# Patient Record
Sex: FEMALE | Race: ASIAN | NOT HISPANIC OR LATINO | Employment: FULL TIME | URBAN - METROPOLITAN AREA
[De-identification: names, ages, dates, MRNs, and addresses within clinical notes are randomized per-mention and may not be internally consistent; named-entity substitution may affect disease eponyms.]

---

## 2017-05-03 ENCOUNTER — TELEPHONE (OUTPATIENT)
Dept: NEUROLOGY | Facility: CLINIC | Age: 67
End: 2017-05-03

## 2017-05-03 NOTE — TELEPHONE ENCOUNTER
----- Message from Lulu Cesar sent at 5/3/2017  3:27 PM CDT -----  Contact: Dagoberto  [phone]  Berry is calling to get a new patient appt for his wife Parkinson's. Please call

## 2017-05-03 NOTE — TELEPHONE ENCOUNTER
----- Message from Lulu Cesar sent at 5/3/2017  3:27 PM CDT -----  Contact: Dagoberto  [phone]  Berry is calling to get a new patient appt for his wife Parkinson's. Please call

## 2017-05-03 NOTE — TELEPHONE ENCOUNTER
Spoke to pt's , Dagoberto, and advised that in order to establish care with PD provider here, it would be best to schedule with NP Marc. 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 An appt has been scheduled for 5/23/17 @ 1:15 with eDbbie here at [facility]. Appt letter to be scanned and emailed to designated address.

## 2017-05-05 NOTE — TELEPHONE ENCOUNTER
Called pt  and scheduled his wife with Dr. Gentile on July 6 at 1:20 p.m. He mentioned that his wife is currently in [place] working and that he will call back and let me know if that's a good day and time to stick with.

## 2017-05-23 ENCOUNTER — OFFICE VISIT (OUTPATIENT)
Dept: NEUROLOGY | Facility: CLINIC | Age: 67
End: 2017-05-23
Payer: MEDICARE

## 2017-05-23 VITALS
HEART RATE: 73 BPM | SYSTOLIC BLOOD PRESSURE: 112 MMHG | WEIGHT: 109.81 LBS | DIASTOLIC BLOOD PRESSURE: 65 MMHG | HEIGHT: 62 IN | BODY MASS INDEX: 20.21 KG/M2

## 2017-05-23 DIAGNOSIS — G20.A1 PARKINSON DISEASE: Primary | ICD-10-CM

## 2017-05-23 PROCEDURE — 99999 PR PBB SHADOW E&M-EST. PATIENT-LVL III: CPT | Mod: PBBFAC,,, | Performed by: NURSE PRACTITIONER

## 2017-05-23 PROCEDURE — 99205 OFFICE O/P NEW HI 60 MIN: CPT | Mod: S$GLB,,, | Performed by: NURSE PRACTITIONER

## 2017-05-23 RX ORDER — AMANTADINE HYDROCHLORIDE 100 MG/1
TABLET ORAL
Qty: 45 TABLET | Refills: 11 | Status: SHIPPED | OUTPATIENT
Start: 2017-05-23 | End: 2018-06-18 | Stop reason: SDUPTHER

## 2017-05-23 RX ORDER — RASAGILINE 0.5 MG/1
1 TABLET ORAL DAILY
COMMUNITY
End: 2017-10-13 | Stop reason: SDUPTHER

## 2017-05-23 RX ORDER — IBUPROFEN 200 MG
200 TABLET ORAL
COMMUNITY

## 2017-05-23 NOTE — PROGRESS NOTES
"Name: Marie Blake  MRN: [id number]   CSN: [id number]      Date: 05/23/2017    Referring physician:  Min Rachel MD  [address] [place] [address] [place] [id number]    Chief Complaint / Interval History: Parkinson's disease    History of Present Illness (HPI):    67 yo RH female here to establish care for PD, alone today. 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 She has always been an avid exerciser (hiking, biking, swimming). She walks 10,000 steps every day.   She provides the following timeline...  -Nov 2014 was walking down a hill while hiking with friend and foot caught causing her to fall- hit face and fractured L wrist, had surgery for wrist  -2015- saw doctor in [facility] as felt "slightly off" when walked but could not otherwise explain but no dizziness and no additional falls. MRI brain revealed meningioma and not felt to be the cause of her symptoms. Sees this provider q 3 mos.  -Nov 2015- co-worker noticed tremor Left index finger. This was the first time she was aware of tremor. She reaseched why this could happen and Parkinson's was one possible explanation. Began to notice resting tremor in LH.   -2016- saw PCP in [place] and he felt she had PD- re-scanned brain to eval meningioma, had not grown.  -May 2016-Isatu scan positive and began rasagiline, does find helpful.  -November 2016- saw doctor in [place] (at [facility]) as they will likely retire there to be near daughter and wants to have established care there, doctor also agreed with dx of PD (stage 2).    At this point, tremor mainly noted in LH. Over the past couple of months, she has noted occasional, slight tremor in RH. She has also noted intermittent shaking in BLE when sitting and at times when crosses legs. When stressed or nervous, tremor increases. Aware of tremor with eating and drinking. Handwriting has not been an issue and has not noted " "changes. She does feel rasagiline helped slow tremor. She did come off this for 2 weeks before cataract surgery in the Fall. Now not as sure of benefits but tolerates without issue and does not cost her anything. She likes the idea of possibly slowing PD and wants to stay on this.   She has also noted the following symptoms...  -Joint pain in hands  -Fatigue  -Muscle stiffness at times,-generally neck wonders if this is due to the amount of time she is on her computer   -Balance "not as good but not bad." No recent falls. No shuffle.    She is not aware of slowness at all. Some days she feels "really okay" does not notice her symptoms at all. Some days just hit and she is more aware of symptoms.  She is trying to decide if she should continue to work. She does not want anyone at work to know she has PD. She states that does worry people at work will see the tremor and she worries when she has to give presentations. She says they have a strong rumor mill at work and just does not want people to speculate and talk. At 66 yoa, those at her work are typically expected to retire. She was offered a one year extension. She has taken this but does wonder what her disease will be like in the future and wonders if she should retire. She is interested in traveling.       Nonmotor/Premotor ROS:  Hyposmia (HENT)?No   Sialorrhea (HENT)? Will feel saliva corner of mouth on rare occasion  Dyshagia (HENT)? No  RBD/sleep issues (Constitutional)?Yes- will awake once or twice to go to restroom but sleeps 7-8 hours -  has commented that she will talk in sleep or kick on occasion  Depression/anxiety (Psychiatric)?Yes- was a little depressed when initially told had PD  Fatigue (Constitutional)?Yes  Constipation (GI)?"not as regular but not real constipation"  Urinary issues ()?No  Orthostasis (Cardiovascular)?No  Leg swelling (Cardiovascular)? No  Falls (Musculoskeletal)?No  Cognitive impairment (Neurologic)?No  Psychoses " "(Psychiatric)?No  Pain/Paresthesia (Neurologic)?Yes- finger joints  Visual changes (Eyes)?Yes- just had B cataract surgery   Moles / skin changes (Skin)?Yes- bug bites more  Stridor / SOB (Pulm)?No  Bruising (Heme)?No    Past Medical History: The patient  has no past medical history on file.    Social History: The patient  reports that she has never smoked. She does not have any smokeless tobacco history on file.    Family History: Their family history is not on file.    Allergies: No known allergies     Meds:   No current outpatient prescriptions on file prior to visit.     No current facility-administered medications on file prior to visit.        Exam:  /65   Pulse 73   Ht 5' 2" (1.575 m)   Wt 49.8 kg (109 lb 12.6 oz)   BMI 20.08 kg/m²     Constitutional  Well-developed, well-nourished, appears stated age   Ophthalmoscopic  B cataract surgery. No papilledema with no hemorrhages or exudates R. Unable to visualize left disc.    Cardiovascular  Radial pulses 2+ and symmetric, no LE edema bilaterally   Neurological    * Mental status       - Orientation  Oriented to person, place, time, and situation     - Memory   Intact      - Attention/concentration  Attentive, vigilant during exam     - Language   +2-step commands     - Fund of knowledge  Aware of current events     - Executive  Well-organized thoughts   * Cranial nerves       - CN II  PERRL, visual fields full to confrontation     - CN III, IV, VI  Extraocular movements full, normal pursuits and saccades, no reduced blink reflex     - CN V  Sensation V1 - V3 intact     - CN VII  Face strong and symmetric bilaterally     - CN VIII  Hearing intact bilaterally     - CN IX, X  Palate raises midline and symmetric     - CN XI  SCM and trapezius 5/5 bilaterally     - CN XII  Tongue midline   * Motor  Muscle bulk normal, strength 5/5 throughout   * Sensory   Intact to LT and vibration throughout   * Coordination  No dysmetria with finger-to-nose    * Gait  See " below.   * Deep tendon reflexes  2+ and symmetric throughout    SEE HANDWRITING SAMPLE BELOW.  ..III.  MOTOR EXAMINATION - takes rasagiline only       Speech  1 - Slight loss of expression, dictation, and/or volumn.   Facial Expression  0 - Normal.   Tremor at Rest:      Face, lips, chin 0 - Absent.    Hands:      right 1 - Slight and infrequently present.    left 1 - Slight and infrequently present. Slightly more noted LH.     Feet:      right 0 - Absent.    left 0 - Absent.    Action or Postural Tremor of Hands      right 1 - Slight; present with action.   left 1 - Slight; present with action. L>R   Rigidity      Neck 0 - Absent.   Upper Extremity: Right 1 - Slight or detectable only when activated by mirror or other movements.   Upper Extremity: Left 1 - Slight or detectable only when activated by mirror or other movements. Mild at left wrist.    Lower Extremity: Right 0 - Absent.   Lower Extremity: Left 0 - Absent.   Finger Taps      right 0 - Normal.   left 1 - Mild slowing and/or reduction in amplitude.   Hand Movements      right 0 - Normal.   left 1 - Mild slowing and/or reduction in amplitude.   Rapid Alternating Movements of Hands      right 0 - Normal.   left 2 - Moderately impaired. Definite and early fatiguing. May have occasional arrests in movement.   Leg Agility      right 0 - Normal.   left 1 - Mild slowing and/or reduction in amplitude.   Arising from Chair  0 - Normal.   Posture  0 - Normal erect.   Gait  1 - Walks essentially normal, does have reduced L arm swing.   Postural Stability (Response to sudden, strong posterior displacement produced by pull on shoulders while patient erect with eyes open and feet slightly apart. Patient is prepared, and can have had some practice runs.)  0 - Normal.   Body Bradykinesia and Hypokinesia (Combining slowness, hesitancy, decreased armswing, small amplitude, and poverty of movement in general)  1 - Minimal slowness, giving movement a deliberate character;  could be normal for some persons. Possibly reduced amplitude.           Laboratory/Radiological:  - Results:  No results found for any previous visit.       - Independent review of images:NA  States her  sent records in advance of today's appt. Do not see scanned into chart. In not available by her next appt, will ask her to send me records and scans of brain.    Diagnoses:            Parkinson's disease    Medical Decision Making:    Mrs. Blake does have early Parkinson's disease (tremor, bradykinesia and rigidity) and is currently doing well on rasagiline only. Discussed diagnosis, progression, and treatment of PD. She comes well prepared with questions. All answered. She would like specifics on when she should retire. I have no reservations about her continued employment. I do not feel delaying her assisted a couple of years will prohibit her from traveling. She understands there is no way to specifically predict this. She is very active and I have encouraged her to continue this.     She will continue rasagiline.   She has never been one to take medicines and struggles with the thought of routine meds. She also worries about tremor at work. Discussed amantadine. She would like to try this. She will begin 100 mg qAM and repeat dose 6-8 hours later PRN tremor. She could try this medication PRN to see if it helps on days she is more aware of shaking. Possible SE discussed, including dry mouth, memory impairment and urine retention.  At this point, I do not feel DA or l-dopa is necessary.   She is not sure when she will be back in town. She initially tried to make appt with Dr. Gentile but could not get appt while she was in town. I have encouraged her to try and secure appt with him as soon as she knows she will be back in the area as he books quickly. I will be happy to follow as well.   Call/email for problems.       I spent 97 minutes face-to-face with the patient with >75% of the time spent with  counseling and education regarding:  - results of data, diagnosis, and recommendations stated above  - the prognosis, diagnosis, and treatment of PD  - risks and benefits of rasagiline and amantadine  - importance of diet and exercise    Debbie Tran DNP, NP-C  Division of Movement and Memory Disorders  [facility]  [phone]

## 2017-05-23 NOTE — LETTER
May 24, 2017      Min Rachel MD  [address] [place] [address] [address] [place] [id number]           [facility]  [address]  [facility] [address]  Phone: [phone]  Fax: [phone]          Patient: Marie Blake   MR Number: [id number]   YOB: 1950   Date of Visit: 5/23/2017       Dear Dr. Min Rachel:    Thank you for referring Marie Blake to me for evaluation. Attached you will find relevant portions of my assessment and plan of care.    If you have questions, please do not hesitate to call me. I look forward to following Marie Blake along with you.    Sincerely,    Debbie Tran, JEN    Enclosure  CC:  No Recipients    If you would like to receive this communication electronically, please contact [email or web address] or [phone] to request more information on [facility] access.    For providers and/or their staff who would like to refer a patient to [facility], please contact us through our one-stop-shop provider referral line, [facility], at [phone].    If you feel you have received this communication in error or would no longer like to receive these types of communications, please e-mail [email or web address]

## 2017-07-27 ENCOUNTER — TELEPHONE (OUTPATIENT)
Dept: NEUROLOGY | Facility: CLINIC | Age: 67
End: 2017-07-27

## 2017-07-27 NOTE — TELEPHONE ENCOUNTER
----- Message from Madelaine Damon sent at 7/26/2017  9:36 AM CDT -----  Contact: isidro () @ [phone]  Calling to reschedule pts appt for 7-6-17.  Pts  just received the reminder slip.  It is post marlene 7-18-17 after the appt.  Pt words out of the country and will be here the 1st and 2nd week of October.  pls call with an appt.

## 2017-07-27 NOTE — TELEPHONE ENCOUNTER
Called patient  and left a voicemail informing him to call back so I can scheduled her appointment.

## 2017-07-28 NOTE — TELEPHONE ENCOUNTER
----- Message from Madelaine Damon sent at 7/28/2017 10:30 AM CDT -----  Contact: isidro () @ [phone]  pts  says he is returning your call concerning an appt for his wife.

## 2017-10-13 ENCOUNTER — OFFICE VISIT (OUTPATIENT)
Dept: NEUROLOGY | Facility: CLINIC | Age: 67
End: 2017-10-13
Payer: MEDICARE

## 2017-10-13 VITALS
HEART RATE: 78 BPM | DIASTOLIC BLOOD PRESSURE: 70 MMHG | BODY MASS INDEX: 20.24 KG/M2 | WEIGHT: 110 LBS | HEIGHT: 62 IN | SYSTOLIC BLOOD PRESSURE: 114 MMHG

## 2017-10-13 DIAGNOSIS — G20.A1 PARKINSON DISEASE: Primary | ICD-10-CM

## 2017-10-13 PROCEDURE — 99999 PR PBB SHADOW E&M-EST. PATIENT-LVL III: CPT | Mod: PBBFAC,,, | Performed by: PSYCHIATRY & NEUROLOGY

## 2017-10-13 PROCEDURE — 99214 OFFICE O/P EST MOD 30 MIN: CPT | Mod: S$GLB,,, | Performed by: PSYCHIATRY & NEUROLOGY

## 2017-10-13 RX ORDER — RASAGILINE 1 MG/1
1 TABLET ORAL DAILY
Qty: 90 TABLET | Refills: 3 | Status: SHIPPED | OUTPATIENT
Start: 2017-10-13 | End: 2018-06-18 | Stop reason: SDUPTHER

## 2017-10-13 NOTE — PROGRESS NOTES
"Name: Marie Blake  MRN: [id number]   CSN: [id number]      Date: 10/13/2017    Chief Complaint / Interval History: Parkinson's disease  - affirmed the history below  - has a few more questions today about her symptoms  - she has recognzies slightly worsening tremor on the L hand, feels a bit in the R hand "like it wants to tremor"  - works in [facility], physically travels back here 3-4/year  - still sees the doctors there - gets good care there, but must see them every month for meds!  Comes here for 3 month supplies...  - walking is a little less steady, no falls  - neck is feeling more stiff  - no constipation  - no bladder control issues, sec drive is normal  - not a syncopizer    Still on azilect alone, NOT taking the amantadine      History of Present Illness (HPI):    67 yo RH female here to establish care for PD, alone today. 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 She has always been an avid exerciser (hiking, biking, swimming). She walks 10,000 steps every day.   She provides the following timeline...  -Nov 2014 was walking down a hill while hiking with friend and foot caught causing her to fall- hit face and fractured L wrist, had surgery for wrist  -2015- saw doctor in [place] as felt "slightly off" when walked but could not otherwise explain but no dizziness and no additional falls. MRI brain revealed meningioma and not felt to be the cause of her symptoms. Sees this provider q 3 mos.  -Nov 2015- co-worker noticed tremor Left index finger. This was the first time she was aware of tremor. She reaseched why this could happen and Parkinson's was one possible explanation. Began to notice resting tremor in LH.   -2016- saw PCP in [place] and he felt she had PD- re-scanned brain to eval meningioma, had not grown.  -May 2016-Isatu scan positive and began rasagiline, does find helpful.  -November 2016- saw doctor in " "[place] (at [facility]) as they will likely retire there to be near daughter and wants to have established care there, doctor also agreed with dx of PD (stage 2).    At this point, tremor mainly noted in LH. Over the past couple of months, she has noted occasional, slight tremor in RH. She has also noted intermittent shaking in BLE when sitting and at times when crosses legs. When stressed or nervous, tremor increases. Aware of tremor with eating and drinking. Handwriting has not been an issue and has not noted changes. She does feel rasagiline helped slow tremor. She did come off this for 2 weeks before cataract surgery in the Fall. Now not as sure of benefits but tolerates without issue and does not cost her anything. She likes the idea of possibly slowing PD and wants to stay on this.   She has also noted the following symptoms...  -Joint pain in hands  -Fatigue  -Muscle stiffness at times,-generally neck wonders if this is due to the amount of time she is on her computer   -Balance "not as good but not bad." No recent falls. No shuffle.    She is not aware of slowness at all. Some days she feels "really okay" does not notice her symptoms at all. Some days just hit and she is more aware of symptoms.  She is trying to decide if she should continue to work. She does not want anyone at work to know she has PD. She states that does worry people at work will see the tremor and she worries when she has to give presentations. She says they have a strong rumor mill at work and just does not want people to speculate and talk. At 66 yoa, those at her work are typically expected to retire. She was offered a one year extension. She has taken this but does wonder what her disease will be like in the future and wonders if she should retire. She is interested in traveling.       Nonmotor/Premotor ROS:  Hyposmia (HENT)?No   Sialorrhea (HENT)? Will feel saliva corner of mouth on rare occasion  Dyshagia (HENT)? No  RBD/sleep issues " "(Constitutional)?Yes- will awake once or twice to go to restroom but sleeps 7-8 hours -  has commented that she will talk in sleep or kick on occasion  Depression/anxiety (Psychiatric)?Yes- was a little depressed when initially told had PD  Fatigue (Constitutional)?Yes  Constipation (GI)?"not as regular but not real constipation"  Urinary issues ()?No  Orthostasis (Cardiovascular)?No  Leg swelling (Cardiovascular)? No  Falls (Musculoskeletal)?No  Cognitive impairment (Neurologic)?No  Psychoses (Psychiatric)?No  Pain/Paresthesia (Neurologic)?Yes- finger joints  Visual changes (Eyes)?Yes- just had B cataract surgery   Moles / skin changes (Skin)?Yes- bug bites more  Stridor / SOB (Pulm)?No  Bruising (Heme)?No    Past Medical History: The patient  has a past medical history of Meningioma and Movement disorder.    Social History: The patient  reports that she has never smoked. She does not have any smokeless tobacco history on file. She reports that she does not drink alcohol or use drugs.    Family History: Their family history includes Cancer (age of onset: 50) in her maternal uncle; Cancer (age of onset: 58) in her sister; Cancer (age of onset: 60) in her sister; Cancer (age of onset: 65) in her brother; Diabetes in her mother; No Known Problems in her brother and daughter; Stroke in her father.    Allergies: No known allergies     Meds:   Current Outpatient Prescriptions on File Prior to Visit   Medication Sig Dispense Refill    CALCIUM CARBONATE/VITAMIN D3 (CALCIUM 500 + D, D3, ORAL) Take by mouth.      ibuprofen (ADVIL,MOTRIN) 200 MG tablet Take 200 mg by mouth as needed for Pain (as needed for pain).      rasagiline (AZILECT) 0.5 MG Tab Take 1 mg by mouth once daily.      amantadine HCl 100 mg Tab Take 1 tablet in morning. Okay to re-dose 6 hours later if needed for tremor. 45 tablet 11     No current facility-administered medications on file prior to visit.        Exam:  /70 (BP Location: Left " "arm, Patient Position: Sitting, BP Method: Large (Automatic))   Pulse 78   Ht 5' 1.5" (1.562 m)   Wt 49.9 kg (110 lb)   BMI 20.45 kg/m²     Constitutional  Well-developed, well-nourished, appears stated age   Ophthalmoscopic  B cataract surgery. No papilledema with no hemorrhages or exudates R. Unable to visualize left disc.    Cardiovascular  Radial pulses 2+ and symmetric, no LE edema bilaterally   Neurological    * Mental status       - Orientation  Oriented to person, place, time, and situation     - Memory   Intact      - Attention/concentration  Attentive, vigilant during exam     - Language   +2-step commands     - Fund of knowledge  Aware of current events     - Executive  Well-organized thoughts   * Cranial nerves       - CN II  PERRL, visual fields full to confrontation     - CN III, IV, VI  Extraocular movements full, normal pursuits and saccades, no reduced blink reflex     - CN V  Sensation V1 - V3 intact     - CN VII  Face strong and symmetric bilaterally     - CN VIII  Hearing intact bilaterally     - CN IX, X  Palate raises midline and symmetric     - CN XI  SCM and trapezius 5/5 bilaterally     - CN XII  Tongue midline   * Motor  Muscle bulk normal, strength 5/5 throughout   * Sensory   Intact to LT and vibration throughout   * Coordination  No dysmetria with finger-to-nose    * Gait  See below.   * Deep tendon reflexes  2+ and symmetric throughout      ..III.  MOTOR EXAMINATION - takes rasagiline only From APRIL       Speech  1 - Slight loss of expression, dictation, and/or volumn.   Facial Expression  0 - Normal.   Tremor at Rest:      Face, lips, chin 0 - Absent.    Hands:      right 1 - Slight and infrequently present.    left 1 - Slight and infrequently present. Slightly more noted LH.     Feet:      right 0 - Absent.    left 0 - Absent.    Action or Postural Tremor of Hands      right 1 - Slight; present with action.   left 1 - Slight; present with action. L>R   Rigidity      Neck 0 - " "Absent.   Upper Extremity: Right 1 - Slight or detectable only when activated by mirror or other movements.   Upper Extremity: Left 1 - Slight or detectable only when activated by mirror or other movements. Mild at left wrist.    Lower Extremity: Right 0 - Absent.   Lower Extremity: Left 0 - Absent.   Finger Taps      right 0 - Normal.   left 1 - Mild slowing and/or reduction in amplitude.   Hand Movements      right 0 - Normal.   left 1 - Mild slowing and/or reduction in amplitude.   Rapid Alternating Movements of Hands      right 0 - Normal.   left 2 - Moderately impaired. Definite and early fatiguing. May have occasional arrests in movement.   Leg Agility      right 0 - Normal.   left 1 - Mild slowing and/or reduction in amplitude.   Arising from Chair  0 - Normal.   Posture  0 - Normal erect.   Gait  1 - Walks essentially normal, does have reduced L arm swing.   Postural Stability (Response to sudden, strong posterior displacement produced by pull on shoulders while patient erect with eyes open and feet slightly apart. Patient is prepared, and can have had some practice runs.)  0 - Normal.   Body Bradykinesia and Hypokinesia (Combining slowness, hesitancy, decreased armswing, small amplitude, and poverty of movement in general)  1 - Minimal slowness, giving movement a deliberate character; could be normal for some persons. Possibly reduced amplitude.     III.  MOTOR EXAMINATION        Speech  1 - Slight loss of expression, dictation, and/or volumn.   Facial Expression  1 - Minimal Hypomimia, could be normal "Poker Face".   Tremor at Rest:      Face, lips, chin 0 - Absent.    Hands:      right 0 - Absent.    left 1 - Slight and infrequently present.    Feet:      right 0 - Absent.    left 1 - Slight and infrequently present.    Action or Postural Tremor of Hands      right 0 - Absent.    left 0 - Absent.    Rigidity      Neck 1 - Slight or detectable only when activated by mirror or other movements.   Upper " Extremity: Right 1 - Slight or detectable only when activated by mirror or other movements.   Upper Extremity: Left 2 - Mild or moderate.   Lower Extremity: Right 1 - Slight or detectable only when activated by mirror or other movements.   Lower Extremity: Left 2 - Mild or moderate.   Finger Taps      right 1 - Mild slowing and/or reduction in amplitude.   left 2 - Moderately impaired. Definite and early fatiguing. May have occasional arrests in movement.   Hand Movements      right 1 - Mild slowing and/or reduction in amplitude.   left 2 - Moderately impaired. Definite and early fatiguing. May have occasional arrests in movement.   Rapid Alternating Movements of Hands      right 1 - Mild slowing and/or reduction in amplitude.   left 2 - Moderately impaired. Definite and early fatiguing. May have occasional arrests in movement.   Leg Agility      right 0 - Normal.   left 2 - Moderately impaired. Definite and early fatiguing. May have occasional arrests in movement.   Arising from Chair  0 - Normal.   Posture  1 - Not quite erect, slightly stooped posture; could be normal for older person.   Gait  0 - Normal.   Postural Stability (Response to sudden, strong posterior displacement produced by pull on shoulders while patient erect with eyes open and feet slightly apart. Patient is prepared, and can have had some practice runs.)  0 - Normal.   Body Bradykinesia and Hypokinesia (Combining slowness, hesitancy, decreased armswing, small amplitude, and poverty of movement in general)  1 - Minimal slowness, giving movement a deliberate character; could be normal for some persons. Possibly reduced amplitude.     Laboratory/Radiological:  - Results:  No visits with results within 3 Month(s) from this visit.   Latest known visit with results is:   No results found for any previous visit.     - Independent review of images:NA  States her  sent records in advance of today's appt. Do not see scanned into chart. In not  available by her next appt, will ask her to send me records and scans of brain.    Diagnoses:            1) Parkinson's disease    Medical Decision Making:  - only slightly worsened from previous - still accounting well with only rasagiline 1 daily  - consider amantadine low dose daily only or prn as per Debbie's recs  - continue activity levels   - healthy diet          Chris Gentile MD, MPH  Division of Movement and Memory Disorders  [facility]

## 2018-05-04 ENCOUNTER — TELEPHONE (OUTPATIENT)
Dept: NEUROLOGY | Facility: CLINIC | Age: 68
End: 2018-05-04

## 2018-05-04 NOTE — TELEPHONE ENCOUNTER
----- Message from Elizabeth Ortiz sent at 5/4/2018 10:49 AM CDT -----  Patient Requesting Sooner Appointment.     Reason: (I.e. Caller declined first available, appointment listed below. Caller will not accept being placed on the waitlist and is requesting a message be sent to doctor, etc.)    Name of Caller:Amrit ()  When is the first available appointment?8/13  Symptoms:Parkinson's follow up  Communication Preference (Simrant response to Pt. (or) Call Back # and timeframe):[phone]  Additional Information:calling to schedule an appt with Dr. Gentile, asking to be seen in the month of June (4,13,18-20 & 22). Please call

## 2018-06-18 ENCOUNTER — OFFICE VISIT (OUTPATIENT)
Dept: NEUROLOGY | Facility: CLINIC | Age: 68
End: 2018-06-18
Payer: MEDICARE

## 2018-06-18 VITALS
DIASTOLIC BLOOD PRESSURE: 69 MMHG | WEIGHT: 112 LBS | HEIGHT: 62 IN | HEART RATE: 75 BPM | SYSTOLIC BLOOD PRESSURE: 116 MMHG | BODY MASS INDEX: 20.61 KG/M2

## 2018-06-18 DIAGNOSIS — G20.A1 PARKINSON DISEASE: Primary | ICD-10-CM

## 2018-06-18 PROCEDURE — 99214 OFFICE O/P EST MOD 30 MIN: CPT | Mod: S$GLB,,, | Performed by: NURSE PRACTITIONER

## 2018-06-18 PROCEDURE — 99999 PR PBB SHADOW E&M-EST. PATIENT-LVL III: CPT | Mod: PBBFAC,,, | Performed by: NURSE PRACTITIONER

## 2018-06-18 RX ORDER — AMANTADINE HYDROCHLORIDE 100 MG/1
TABLET ORAL
Qty: 180 TABLET | Refills: 3 | Status: SHIPPED | OUTPATIENT
Start: 2018-06-18 | End: 2022-08-31 | Stop reason: SDUPTHER

## 2018-06-18 RX ORDER — RASAGILINE 1 MG/1
1 TABLET ORAL DAILY
Qty: 90 TABLET | Refills: 3 | Status: SHIPPED | OUTPATIENT
Start: 2018-06-18 | End: 2019-01-03 | Stop reason: SDUPTHER

## 2018-06-18 NOTE — PROGRESS NOTES
Name: Marie Blake  MRN: [id number]   CSN: [id number]      Date: 6-18-18      Referring physician:  Min Rachel MD  [address] [place] [address] [place] [address]    Subjective:      Chief Complaint: tremor    History of Present Illness (HPI):    Marie Blake is a 67 y.o. right-handed female who presents today for a follow-up evaluation of Parkinson's Disease and is alone. Last seen in office by Dr. Gentile 10-13-17. At that time, continued rasagiline 1 mg daily and rec amantadine 100 mg prn.     Feels tremor gradually progessing- little more tremor in LH and LLE.  Some mucle stiffness- mainly notes at work as tries to control tremor.     Did not start taking amantadine until last month and now only takes occasionally. She is not sure if it helps or not. Tremor fluctuates. She is staring to note more tremor in legs.    Takes only takes once daily when she does take.     Remains on rasagiline.     Review of Systems   Gastrointestinal: Negative for constipation.   Musculoskeletal: Negative for gait problem.   Neurological: Positive for tremors.   Psychiatric/Behavioral: Positive for sleep disturbance. Negative for dysphoric mood.       Past Medical History: The patient  has a past medical history of Meningioma and Movement disorder.    Social History: The patient  reports that she has never smoked. She does not have any smokeless tobacco history on file. She reports that she does not drink alcohol or use drugs.    Family History: Their family history includes Cancer (age of onset: 50) in her maternal uncle; Cancer (age of onset: 58) in her sister; Cancer (age of onset: 60) in her sister; Cancer (age of onset: 65) in her brother; Diabetes in her mother; No Known Problems in her brother and daughter; Stroke in her father.    Allergies: No known allergies     Meds:   Current Outpatient Prescriptions on File Prior to Visit   Medication Sig Dispense Refill    amantadine HCl 100 mg Tab Take 1 tablet in  "morning. Okay to re-dose 6 hours later if needed for tremor. 45 tablet 11    CALCIUM CARBONATE/VITAMIN D3 (CALCIUM 500 + D, D3, ORAL) Take by mouth.      ibuprofen (ADVIL,MOTRIN) 200 MG tablet Take 200 mg by mouth as needed for Pain (as needed for pain).      rasagiline (AZILECT) 1 mg Tab Take 1 tablet (1 mg total) by mouth once daily. 90 tablet 3     No current facility-administered medications on file prior to visit.        Objective:     Physical Exam:    Vitals:    06/18/18 1134   BP: 116/69   Pulse: 75   Weight: 50.8 kg (112 lb)   Height: 5' 1.5" (1.562 m)     Body mass index is 20.82 kg/m².    Constitutional  Well-developed, well-nourished, appears stated age   Cardiovascular  Radial pulses 2+ and symmetric, no LE edema bilaterally     ..III.  MOTOR EXAMINATION        Speech  1 - Slight loss of expression, dictation, and/or volumn.   Facial Expression  1 - Minimal Hypomimia, could be normal "Poker Face".   Tremor at Rest:      Face, lips, chin 0 - Absent.    Hands:      right 0-Absent.    left 1 - Slight and infrequently present.    Feet:      right 0 - Absent.    left 0 - Absent.    Action or Postural Tremor of Hands      right 0 - Absent.    left 1 - Slight; present with action.   Rigidity      Neck 0 - Absent.   Upper Extremity: Right 1 - Slight or detectable only when activated by mirror or other movements.   Upper Extremity: Left 2 - Mild or moderate.   Lower Extremity: Right 0 - Absent.   Lower Extremity: Left 1 - Slight or detectable only when activated by mirror or other movements.   Finger Taps      right 0 - Normal.   left 0 - Normal.   Hand Movements      right 0 - Normal.   left 1 - Mild slowing and/or reduction in amplitude.   Rapid Alternating Movements of Hands      right 0 - Normal.   left 1 - Mild slowing and/or reduction in amplitude.   Leg Agility      right 0 - Normal.   left 1 - Mild slowing and/or reduction in amplitude.   Arising from Chair  0 - Normal.   Posture  1 - Not quite erect, " slightly stooped posture; could be normal for older person.   Gait  0 - Normal.   Postural Stability (Response to sudden, strong posterior displacement produced by pull on shoulders while patient erect with eyes open and feet slightly apart. Patient is prepared, and can have had some practice runs.)  deferred   Body Bradykinesia and Hypokinesia (Combining slowness, hesitancy, decreased armswing, small amplitude, and poverty of movement in general)  1 - Minimal slowness, giving movement a deliberate character; could be normal for some persons. Possibly reduced amplitude.         Laboratory Results:  No visits with results within 3 Month(s) from this visit.   Latest known visit with results is:   No results found for any previous visit.         Assessment and Plan     Parkinson disease        Medical Decision Making:    Take amantadine 100 mg daily. Okay to re-dose later in day if needed for increase tremor.  Continue rasagiline 1 mg daily.   She will be leaving for [facility] again June 23. She is active on [facility].   She will [place] the [place] in [place] but plans on going to [place] for the birth of her grandchild.   She will let us know when she needs follow up scheduled.       I spent 30 minutes face-to-face with the patient with >50% of the time spent with counseling and education regarding:  - results of data, diagnosis, and recommendations stated above  - the prognosis of   - risks and benefits of amantadine  - importance of diet and exercise    Debbie Tran, DNP, NP-C  Division of Movement and Memory Disorders  [facility]  [phone]

## 2018-06-18 NOTE — LETTER
June 18, 2018      Min Rachel MD  [address] [place] [address] Dr [place] [address]           [facility]  [address]  [facility] [address]  Phone: [phone]  Fax: [phone]          Patient: Marie Blake   MR Number: [id number]   YOB: 1950   Date of Visit: 6/18/2018       Dear Dr. Min Rachel:    Thank you for referring Marie Blake to me for evaluation. Attached you will find relevant portions of my assessment and plan of care.    If you have questions, please do not hesitate to call me. I look forward to following Marie Blake along with you.    Sincerely,    Debbie Tran, JEN    Enclosure  CC:  No Recipients    If you would like to receive this communication electronically, please contact [email or web address] or [phone] to request more information on [facility] access.    For providers and/or their staff who would like to refer a patient to [facility], please contact us through our one-stop-shop provider referral line, [facility], at [phone].    If you feel you have received this communication in error or would no longer like to receive these types of communications, please e-mail [email or web address]

## 2018-06-18 NOTE — PATIENT INSTRUCTIONS
Please take amantadine 100 mg daily. Okay to take a 2nd dose later that day if needed for increase tremor.     Continue rasagiline 1 mg daily.

## 2019-01-03 DIAGNOSIS — G20.A1 PARKINSON DISEASE: ICD-10-CM

## 2019-01-03 RX ORDER — RASAGILINE 1 MG/1
1 TABLET ORAL DAILY
Qty: 90 TABLET | Refills: 3 | Status: SHIPPED | OUTPATIENT
Start: 2019-01-03 | End: 2020-03-19 | Stop reason: SDUPTHER

## 2019-04-07 ENCOUNTER — PATIENT MESSAGE (OUTPATIENT)
Dept: NEUROLOGY | Facility: CLINIC | Age: 69
End: 2019-04-07

## 2019-09-30 ENCOUNTER — TELEPHONE (OUTPATIENT)
Dept: NEUROLOGY | Facility: CLINIC | Age: 69
End: 2019-09-30

## 2019-09-30 NOTE — TELEPHONE ENCOUNTER
----- Message from Pavithra Arceo MA sent at 9/30/2019  9:41 AM CDT -----  Contact: Kassandra ([facility]) [phone]  Spoke with Kassandra she states the pt insurance denied refills for namenda and azilect due to pt not be found as seen by you in clinic. Can you send a new prescription to the pharmacy? Insurance may approve a new prescription per Kassandra        ----- Message -----  From: Milagro Vera  Sent: 9/30/2019   9:15 AM CDT  To: Marc Cannon called to check the status of the patient's refill request. Please contact the pharmacy to discuss further.

## 2019-09-30 NOTE — TELEPHONE ENCOUNTER
----- Message from Milagro Vera sent at 9/30/2019  9:15 AM CDT -----  Contact: Kassandra ([facility]) [phone]  Kassandra called to check the status of the patient's refill request. Please contact the pharmacy to discuss further.

## 2019-09-30 NOTE — TELEPHONE ENCOUNTER
Spoke with Kassandra she states the pt insurance denied refills for namenda and azilect due to pt not be found as seen by you in clinic. Can you send a new prescription to the pharmacy? Insurance may approve a new prescription per Kassandra. Kassandra informed a message will be sent to Debbie. She voiced understanding

## 2019-09-30 NOTE — TELEPHONE ENCOUNTER
I do not prescribe Aricept or memantine for her.   I prescribed amantadine and rasagiline for her but have not seen her since June 2018.   I cannot give refills until she schedules an appt for follow up.

## 2019-12-03 ENCOUNTER — TELEPHONE (OUTPATIENT)
Dept: NEUROLOGY | Facility: CLINIC | Age: 69
End: 2019-12-03

## 2020-02-08 DIAGNOSIS — G20.A1 PARKINSON DISEASE: ICD-10-CM

## 2020-02-10 RX ORDER — AMANTADINE HYDROCHLORIDE 100 MG/1
CAPSULE, GELATIN COATED ORAL
Qty: 180 CAPSULE | Refills: 0 | OUTPATIENT
Start: 2020-02-10

## 2020-02-10 RX ORDER — RASAGILINE 1 MG/1
TABLET ORAL
Qty: 90 TABLET | Refills: 0 | OUTPATIENT
Start: 2020-02-10

## 2020-03-19 DIAGNOSIS — G20.A1 PARKINSON DISEASE: ICD-10-CM

## 2020-03-19 RX ORDER — RASAGILINE 1 MG/1
1 TABLET ORAL DAILY
Qty: 90 TABLET | Refills: 3 | Status: SHIPPED | OUTPATIENT
Start: 2020-03-19 | End: 2021-03-29

## 2020-03-19 NOTE — TELEPHONE ENCOUNTER
----- Message from Randolph Barriga sent at 3/19/2020  8:40 AM CDT -----  Contact: Dagoberto ( spouse ) @ [phone]  Rx Refill/Request     Is this a Refill or New Rx:  Yes   Rx Name and Strength:  (rasagiline (AZILECT) 1 mg Tab ) (amantadine HCl 100 mg Tab )   Preferred Pharmacy with phone number:     [facility] - [facility] - [address] [place] [address] MARY  [facility] [place] [address]  Phone: [phone] Fax: [phone]

## 2020-03-23 DIAGNOSIS — G20.A1 PARKINSON DISEASE: ICD-10-CM

## 2020-03-23 NOTE — TELEPHONE ENCOUNTER
----- Message from Debbie Tran NP sent at 3/23/2020 11:11 AM CDT -----  Pt requesting refills. I have not seen her since 6-2018. Please see if she can do video visit but also need to see where she lives now. She often goes back and forth from [place] to [place].  If she is not in [place] or [place], I don't think I can do.

## 2020-03-25 DIAGNOSIS — G20.A1 PARKINSON DISEASE: ICD-10-CM

## 2020-03-25 NOTE — TELEPHONE ENCOUNTER
----- Message from Madelaine Damon sent at 3/25/2020 10:04 AM CDT -----  Contact: Frantz (robert) @ [phone]  Pt is req a refill for amantadine HCl 100 mg Tab.     [facility] - [place], [address] [phone] (Phone)  [phone] (Fax)

## 2020-03-25 NOTE — TELEPHONE ENCOUNTER
----- Message from Madelaine Damon sent at 3/25/2020 10:04 AM CDT -----  Contact: Frantz (robert) @ [phone]  Pt is req a refill for amantadine HCl 100 mg Tab.     [facility] - [place], [address] [phone] (Phone)  [phone] (Fax)

## 2020-03-26 RX ORDER — AMANTADINE HYDROCHLORIDE 100 MG/1
CAPSULE, GELATIN COATED ORAL
Qty: 180 CAPSULE | Refills: 0 | OUTPATIENT
Start: 2020-03-26

## 2020-03-26 RX ORDER — AMANTADINE HYDROCHLORIDE 100 MG/1
TABLET ORAL
Qty: 180 TABLET | Refills: 3 | OUTPATIENT
Start: 2020-03-26

## 2020-03-26 NOTE — TELEPHONE ENCOUNTER
"Spoke with Mr. Blake, he states the pt is in [place] at this time working. He was advised it has been over a year since the pt was last seen, in order for the pt to receive refills on any medication she will need to be seen, there are no in person appt at this time, appt will need to be virtual visit through the portal and she will need to be in [place] for this to take place. He voiced understanding stating "can we email back and forth". He was advised through the portal only but it will not work due to her being in [facility], I advised him to have the pt contact the clinic when she returns to set up an appt, I will update Debbie. He voiced understanding.   "

## 2020-04-29 ENCOUNTER — TELEPHONE (OUTPATIENT)
Dept: NEUROLOGY | Facility: CLINIC | Age: 70
End: 2020-04-29

## 2020-04-29 NOTE — TELEPHONE ENCOUNTER
----- Message from Randolph Barriga sent at 4/29/2020 10:21 AM CDT -----  Contact: Patient @ [phone]  Patient calling to get an update on the medication refill on (amantadine HCl 100 mg Tab ) patient states she's currently in [place] due to Covid-19 shut down and that she's interested in a Virtual Visit, pls advise     [facility] - Alli HERNANDEZ  [facility] [place] [id number]  Phone: [phone] Fax: [phone]

## 2020-04-29 NOTE — TELEPHONE ENCOUNTER
Can't do VV< but I would take a phone call as part of regular medical care.    How is she going to get her meds in [place]??

## 2020-05-07 ENCOUNTER — PATIENT MESSAGE (OUTPATIENT)
Dept: NEUROLOGY | Facility: CLINIC | Age: 70
End: 2020-05-07

## 2020-05-11 ENCOUNTER — PATIENT MESSAGE (OUTPATIENT)
Dept: NEUROLOGY | Facility: CLINIC | Age: 70
End: 2020-05-11

## 2022-06-13 ENCOUNTER — TELEPHONE (OUTPATIENT)
Dept: NEUROLOGY | Facility: CLINIC | Age: 72
End: 2022-06-13
Payer: MEDICARE

## 2022-06-13 DIAGNOSIS — G20.A1 PARKINSON DISEASE: ICD-10-CM

## 2022-06-13 NOTE — TELEPHONE ENCOUNTER
----- Message from Elizabeth Ortiz sent at 6/13/2022  3:43 PM CDT -----  Regarding: appt  Contact: Frantz (spouse) @ [phone]  Caller asking to speak with someone in Dr. Gentile's office regarding scheduling an appt, no available appts in epic. Please call.

## 2022-06-17 RX ORDER — RASAGILINE 1 MG/1
1 TABLET ORAL DAILY
Qty: 90 TABLET | Refills: 0 | Status: SHIPPED | OUTPATIENT
Start: 2022-06-17 | End: 2022-08-31 | Stop reason: SDUPTHER

## 2022-06-21 ENCOUNTER — PATIENT MESSAGE (OUTPATIENT)
Dept: NEUROLOGY | Facility: CLINIC | Age: 72
End: 2022-06-21
Payer: MEDICARE

## 2022-06-23 ENCOUNTER — PATIENT MESSAGE (OUTPATIENT)
Dept: NEUROLOGY | Facility: CLINIC | Age: 72
End: 2022-06-23
Payer: MEDICARE

## 2022-06-24 ENCOUNTER — PATIENT MESSAGE (OUTPATIENT)
Dept: NEUROLOGY | Facility: CLINIC | Age: 72
End: 2022-06-24
Payer: MEDICARE

## 2022-08-29 ENCOUNTER — OFFICE VISIT (OUTPATIENT)
Dept: NEUROLOGY | Facility: CLINIC | Age: 72
End: 2022-08-29
Payer: MEDICARE

## 2022-08-29 VITALS
BODY MASS INDEX: 19.9 KG/M2 | DIASTOLIC BLOOD PRESSURE: 80 MMHG | HEIGHT: 62 IN | HEART RATE: 79 BPM | WEIGHT: 108.13 LBS | SYSTOLIC BLOOD PRESSURE: 153 MMHG

## 2022-08-29 DIAGNOSIS — G20.A1 PARKINSON'S DISEASE: Primary | ICD-10-CM

## 2022-08-29 DIAGNOSIS — D32.9 MENINGIOMA: ICD-10-CM

## 2022-08-29 PROCEDURE — 1126F AMNT PAIN NOTED NONE PRSNT: CPT | Mod: CPTII,S$GLB,, | Performed by: NURSE PRACTITIONER

## 2022-08-29 PROCEDURE — 1159F PR MEDICATION LIST DOCUMENTED IN MEDICAL RECORD: ICD-10-PCS | Mod: CPTII,S$GLB,, | Performed by: NURSE PRACTITIONER

## 2022-08-29 PROCEDURE — 1159F MED LIST DOCD IN RCRD: CPT | Mod: CPTII,S$GLB,, | Performed by: NURSE PRACTITIONER

## 2022-08-29 PROCEDURE — 99205 OFFICE O/P NEW HI 60 MIN: CPT | Mod: S$GLB,,, | Performed by: NURSE PRACTITIONER

## 2022-08-29 PROCEDURE — 3288F PR FALLS RISK ASSESSMENT DOCUMENTED: ICD-10-PCS | Mod: CPTII,S$GLB,, | Performed by: NURSE PRACTITIONER

## 2022-08-29 PROCEDURE — 3008F PR BODY MASS INDEX (BMI) DOCUMENTED: ICD-10-PCS | Mod: CPTII,S$GLB,, | Performed by: NURSE PRACTITIONER

## 2022-08-29 PROCEDURE — 1101F PT FALLS ASSESS-DOCD LE1/YR: CPT | Mod: CPTII,S$GLB,, | Performed by: NURSE PRACTITIONER

## 2022-08-29 PROCEDURE — 3077F SYST BP >= 140 MM HG: CPT | Mod: CPTII,S$GLB,, | Performed by: NURSE PRACTITIONER

## 2022-08-29 PROCEDURE — 3079F PR MOST RECENT DIASTOLIC BLOOD PRESSURE 80-89 MM HG: ICD-10-PCS | Mod: CPTII,S$GLB,, | Performed by: NURSE PRACTITIONER

## 2022-08-29 PROCEDURE — 1126F PR PAIN SEVERITY QUANTIFIED, NO PAIN PRESENT: ICD-10-PCS | Mod: CPTII,S$GLB,, | Performed by: NURSE PRACTITIONER

## 2022-08-29 PROCEDURE — 1160F RVW MEDS BY RX/DR IN RCRD: CPT | Mod: CPTII,S$GLB,, | Performed by: NURSE PRACTITIONER

## 2022-08-29 PROCEDURE — 1160F PR REVIEW ALL MEDS BY PRESCRIBER/CLIN PHARMACIST DOCUMENTED: ICD-10-PCS | Mod: CPTII,S$GLB,, | Performed by: NURSE PRACTITIONER

## 2022-08-29 PROCEDURE — 1101F PR PT FALLS ASSESS DOC 0-1 FALLS W/OUT INJ PAST YR: ICD-10-PCS | Mod: CPTII,S$GLB,, | Performed by: NURSE PRACTITIONER

## 2022-08-29 PROCEDURE — 3079F DIAST BP 80-89 MM HG: CPT | Mod: CPTII,S$GLB,, | Performed by: NURSE PRACTITIONER

## 2022-08-29 PROCEDURE — 99999 PR PBB SHADOW E&M-EST. PATIENT-LVL III: CPT | Mod: PBBFAC,,, | Performed by: NURSE PRACTITIONER

## 2022-08-29 PROCEDURE — 3077F PR MOST RECENT SYSTOLIC BLOOD PRESSURE >= 140 MM HG: ICD-10-PCS | Mod: CPTII,S$GLB,, | Performed by: NURSE PRACTITIONER

## 2022-08-29 PROCEDURE — 99205 PR OFFICE/OUTPT VISIT, NEW, LEVL V, 60-74 MIN: ICD-10-PCS | Mod: S$GLB,,, | Performed by: NURSE PRACTITIONER

## 2022-08-29 PROCEDURE — 3288F FALL RISK ASSESSMENT DOCD: CPT | Mod: CPTII,S$GLB,, | Performed by: NURSE PRACTITIONER

## 2022-08-29 PROCEDURE — 3008F BODY MASS INDEX DOCD: CPT | Mod: CPTII,S$GLB,, | Performed by: NURSE PRACTITIONER

## 2022-08-29 PROCEDURE — 99999 PR PBB SHADOW E&M-EST. PATIENT-LVL III: ICD-10-PCS | Mod: PBBFAC,,, | Performed by: NURSE PRACTITIONER

## 2022-08-29 NOTE — PROGRESS NOTES
Name: Marie Blake  MRN: [id number]   CSN: [id number]      Date: 8-29-22      Referring physician:  Florencia Self  No address on file    Subjective:      Chief Complaint: Parkinson's disease     History of Present Illness (HPI):    Marie Blake is a 71 y.o. right-handed female who presents today for an initial evaluation of Parkinson's Disease (tremor onset 2015) and is alone. Last seen in this clinic 6-18-18. At that time, she was taking amantadine 100 mg daily with option to re-dose later in the day if needed for increased tremor and continued Azilect 1 mg daily.     Was caught in [facility] for couple of years during pandemic  Retired on October 2021.     Parkinson's disease gradually getting worse. More tremor. Sometimes note in RH but mostly LH and probably LLE a little.  Still trying to keep as active as possible, waking, biking.   Some stifness, improves when start to walk.   She feels Parkinson's disease hinders a little bit on daily basis in that she is a little less social as does not want people to see her tremor.    Takes 0-2 amantadine daily (in AM and afternoon to as late as 7-8PM). It does seem to help.  If she takes 2 tabs one day, she feels she may actually still have beneift the next day.  If she is going to be with people, she will take amantadine.  If she is just going to be home with her family, she won't take it automatically, unless she sees tremor coming on.  She does seem to take amantadine most mornings.     Takes rasagiline daily in the afternoon (around 4-5P).     She walks 10,000 steps per day.     Tremor associated with stress, anxiety, hunger, or if she has been doing a lot of work (like gardening). When relaxed, it is not as bad.     Walking ok. No falls.     Had thyroid bx 2020 in [facility]. She had a few more nodules. She has been referred to endocrinology. She is scheduled to see them in December.  She lives in [place].     She says she is not sure if she told us but she  has meningioma frontal lobe. This was discovered in 2016 after a fall. She gets scanned every year or two.   Her internal med doc in [place] and her doc in [facility] follow this.   Her last scan was 2020 and it was ok.     She has some dull aching in R thumb and fingers. Took ibuprofen, helped.      Review of Systems   Constitutional:  Negative for appetite change.   HENT:  Negative for drooling and trouble swallowing.    Eyes:  Positive for visual disturbance (jsut saw eye doc, needed glasses).        Denies double vision or diplopia   Respiratory:  Positive for choking (once in while choke on water if not careful).    Gastrointestinal:  Negative for constipation (not an issue currently but has been).   Genitourinary: Negative.    Musculoskeletal:  Negative for gait problem.   Neurological:  Positive for tremors and headaches (slight and rare). Negative for dizziness and light-headedness.   Psychiatric/Behavioral:  Negative for sleep disturbance (used to have vivid dream but seems to have stopped).      Past Medical History: The patient  has a past medical history of Meningioma and Movement disorder.    Social History: The patient  reports that she has never smoked. She does not have any smokeless tobacco history on file. She reports that she does not drink alcohol and does not use drugs.    Family History: Their family history includes Cancer (age of onset: 50) in her maternal uncle; Cancer (age of onset: 58) in her sister; Cancer (age of onset: 60) in her sister; Cancer (age of onset: 65) in her brother; Diabetes in her mother; No Known Problems in her brother and daughter; Stroke in her father.    Allergies: No known allergies     Meds:   Current Outpatient Medications on File Prior to Visit   Medication Sig Dispense Refill    CALCIUM CARBONATE/VITAMIN D3 (CALCIUM 500 + D, D3, ORAL) Take by mouth.      ibuprofen (ADVIL,MOTRIN) 200 MG tablet Take 200 mg by mouth as needed for Pain (as needed for pain).       No  "current facility-administered medications on file prior to visit.       Objective:     Physical Exam:    Vitals:    08/29/22 1400   BP: (!) 153/80   BP Location: Right arm   Patient Position: Sitting   Pulse: 79   Weight: 49 kg (108 lb 2.2 oz)   Height: 5' 1.5" (1.562 m)     Body mass index is 20.1 kg/m².    Constitutional  Well-developed, well-nourished, appears stated age   Cardiovascular  no LE edema bilaterally     Awake, alert, pleasant and cooperative.   Easy to establish rapport.   Good historian.   RR equal and unlabored. NAD.   EOMI.  Hearing intact to conversation.       ..III.  MOTOR EXAMINATION - took amantadine at 0730- exam time 1450       Speech  1 - Slight loss of expression, dictation, and/or volumn.   Facial Expression  1 - Minimal Hypomimia, could be normal "Poker Face".   Tremor at Rest:      Face, lips, chin 0 - Absent.    Hands:      right 0 - Absent.    left 1 - Slight and infrequently present.    Feet:      right 0 - Absent.    left 0 - Absent.    Action or Postural Tremor of Hands      right 0 - Absent.    left 1 - Slight; present with action.   Rigidity      Neck 1 - Slight or detectable only when activated by mirror or other movements.   Upper Extremity: Right 1 - Slight or detectable only when activated by mirror or other movements.   Upper Extremity: Left 2 - Mild or moderate.   Lower Extremity: Right 1 - Slight or detectable only when activated by mirror or other movements.   Lower Extremity: Left 2 - Mild or moderate.   Finger Taps      right 1 - Mild slowing and/or reduction in amplitude.   left 2 - Moderately impaired. Definite and early fatiguing. May have occasional arrests in movement.   Hand Movements      right 0 - Normal.   left 2 - Moderately impaired. Definite and early fatiguing. May have occasional arrests in movement.   Rapid Alternating Movements of Hands      right 1 - Mild slowing and/or reduction in amplitude.   left 2 - Moderately impaired. Definite and early " fatiguing. May have occasional arrests in movement.   Leg Agility      right 0 - Normal.   left 1 - Mild slowing and/or reduction in amplitude.   Arising from Chair  0 - Normal.   Posture  1 - Not quite erect, slightly stooped posture; could be normal for older person.   Gait  1 - Walks slowly, may shuffle with short steps, but no festination (hastening steps) or propulsion. Reduced L arm swing   Postural Stability (Response to sudden, strong posterior displacement produced by pull on shoulders while patient erect with eyes open and feet slightly apart. Patient is prepared, and can have had some practice runs.)  deferred   Body Bradykinesia and Hypokinesia (Combining slowness, hesitancy, decreased armswing, small amplitude, and poverty of movement in general)  1 - Minimal slowness, giving movement a deliberate character; could be normal for some persons. Possibly reduced amplitude.         Laboratory Results:  No visits with results within 3 Month(s) from this visit.   Latest known visit with results is:   No results found for any previous visit.           Imaging:   No results found for this or any previous visit.        Assessment and Plan     Parkinson's disease  Comments:  tremor dominant    Meningioma  Comments:  by her account and in frontal lobe[facility] [place] [place] [facility]- gets scanned every 1-2 years      Medical Decision Making:    She will continue amantadine and rasagiline without change.   Discussed taking amantadine every AM to start her day to see if this helps keep tremor in better control.     Also discussed carbidopa/levodopa as option.   Discussed Parkinson's disease, dopamine, l-dopa goals and poss SE. She will consider.     Stay active. Looks great!    Follow up 6 months. Message for problems or questions.     ..Total time: 63 minutes spent on the encounter, which includes face to face time and non-face to face time preparing to see the patient (eg, review  of tests), Obtaining and/or reviewing separately obtained history, Documenting clinical information in the electronic or other health record, Independently interpreting results (not separately reported) and communicating results to the patient/family/caregiver, or Care coordination (not separately reported).       Debbie Tran, DELIO, NP-C  Division of Movement and Memory Disorders  [facility]  [phone]

## 2022-08-29 NOTE — PATIENT INSTRUCTIONS
Continue amantadine and rasagiline without change.   You could consider taking amantadine every morning to start your day.    Another option, you could start carbidopa/levodopa 25/100.  This medication is good for tremor, stiffness, or slowness.     If you decide to do this, let me know.  I will start you out on the lowest dose at half tablet three times daily with a meal.  It is best tolerated with a meal when you first start this medication.  This is the gold standard treatment of Parkinson's disease.     Follow up 6 months.

## 2022-09-05 ENCOUNTER — PATIENT MESSAGE (OUTPATIENT)
Dept: NEUROLOGY | Facility: CLINIC | Age: 72
End: 2022-09-05
Payer: MEDICARE

## 2022-09-07 DIAGNOSIS — G20.A1 PARKINSON'S DISEASE: Primary | ICD-10-CM

## 2022-09-07 RX ORDER — CARBIDOPA AND LEVODOPA 25; 100 MG/1; MG/1
TABLET ORAL
Qty: 90 TABLET | Refills: 5 | Status: SHIPPED | OUTPATIENT
Start: 2022-09-07 | End: 2023-06-19

## 2023-04-25 ENCOUNTER — PATIENT MESSAGE (OUTPATIENT)
Dept: RESEARCH | Facility: HOSPITAL | Age: 73
End: 2023-04-25
Payer: MEDICARE

## 2023-05-09 ENCOUNTER — PATIENT MESSAGE (OUTPATIENT)
Dept: RESEARCH | Facility: HOSPITAL | Age: 73
End: 2023-05-09
Payer: MEDICARE

## 2023-05-16 ENCOUNTER — PATIENT MESSAGE (OUTPATIENT)
Dept: RESEARCH | Facility: HOSPITAL | Age: 73
End: 2023-05-16
Payer: MEDICARE

## 2023-06-16 DIAGNOSIS — G20.A1 PARKINSON'S DISEASE: ICD-10-CM

## 2023-06-19 RX ORDER — CARBIDOPA AND LEVODOPA 25; 100 MG/1; MG/1
TABLET ORAL
Qty: 90 TABLET | Refills: 0 | Status: SHIPPED | OUTPATIENT
Start: 2023-06-19 | End: 2023-09-05

## 2023-07-17 DIAGNOSIS — G20.A1 PARKINSON DISEASE: ICD-10-CM

## 2023-07-17 RX ORDER — AMANTADINE HYDROCHLORIDE 100 MG/1
TABLET ORAL
Qty: 180 TABLET | Refills: 0 | Status: SHIPPED | OUTPATIENT
Start: 2023-07-17 | End: 2023-11-28 | Stop reason: SDUPTHER

## 2023-07-17 RX ORDER — RASAGILINE 1 MG/1
1 TABLET ORAL DAILY
Qty: 90 TABLET | Refills: 0 | Status: SHIPPED | OUTPATIENT
Start: 2023-07-17 | End: 2023-11-28 | Stop reason: SDUPTHER

## 2023-08-24 ENCOUNTER — TELEPHONE (OUTPATIENT)
Dept: NEUROLOGY | Facility: CLINIC | Age: 73
End: 2023-08-24
Payer: MEDICARE

## 2023-08-24 NOTE — TELEPHONE ENCOUNTER
Called pt per CK regarding appt on 10/10. Unfortunately, appt duration is too short only 15 min, so we would need to schedule appt for longer. Offered soonest available for 10/24 at 2:15 PM. No answer, left vm.

## 2023-08-25 ENCOUNTER — TELEPHONE (OUTPATIENT)
Dept: NEUROLOGY | Facility: CLINIC | Age: 73
End: 2023-08-25
Payer: MEDICARE

## 2023-08-25 NOTE — TELEPHONE ENCOUNTER
Called pt again to reschedule appt on 10/10 due to appt being scheduled for 15 min. We rescheduled for 10/24 at 2:15 PM

## 2023-09-02 DIAGNOSIS — G20.A1 PARKINSON'S DISEASE: ICD-10-CM

## 2023-09-05 RX ORDER — CARBIDOPA AND LEVODOPA 25; 100 MG/1; MG/1
TABLET ORAL
Qty: 90 TABLET | Refills: 2 | Status: SHIPPED | OUTPATIENT
Start: 2023-09-05

## 2023-10-23 NOTE — PROGRESS NOTES
Name: Marie Blake  MRN: [id number]   CSN: [id number]      Date: 10-24-23      Referring physician:  No referring provider defined for this encounter.    Subjective:      Chief Complaint: Parkinson's disease     History of Present Illness (HPI):    Marie Blake is a 73 y.o. right-handed female with meningioma (discovered on scan in 2016 after a fall, gets scanned every year or two by internal med) who presents today for a follow-up evaluation of Parkinson's Disease (tremor onset 2015) and is alone. Last seen in office 8-29-22. At that time, amantadine and rasagiline cont without change. Discussed option of l-dopa.  She opted to begin about a month after our last visit.     Amantadine - usually around 8-9A, and will redose usually around 2 but 10- 15 min after rasagiline  Rasagiline -usually around 2P  Carbidopa/levodopa - stopped it- threw up. Tried couple times. Would even break out in sweat. The most she was able to take was half tab twice daily.   Dinner is her biggest meal of day (usually around 7P)      Noted progression since last visit.   Tremor more notable sicne last visit. Notes tremor in BH LH>RH.    Some tremor LLE.   Movements not as smoothe when cooking.  +stiff  Arms feel weaker when trying to push self up for exrecise (does not needto push up to get put of chair)  Slower than used to be.  Balance ok. Have to pay more attention. Does not walk as fast acorss the street or look at sidewalk more carefully.         Still walk 10K steps per day.           Review of Systems   HENT:  Negative for trouble swallowing.    Respiratory:  Negative for cough and choking.    Gastrointestinal:  Negative for constipation.   Musculoskeletal:  Positive for gait problem.   Neurological:  Positive for tremors.   Psychiatric/Behavioral:  Positive for dysphoric mood (little depressed by the disease). Negative for hallucinations and sleep disturbance. The patient is not nervous/anxious.        Past Medical  "History: The patient  has a past medical history of Meningioma and Movement disorder.    Social History: The patient  reports that she has never smoked. She does not have any smokeless tobacco history on file. She reports that she does not drink alcohol and does not use drugs.    Family History: Their family history includes Cancer (age of onset: 50) in her maternal uncle; Cancer (age of onset: 58) in her sister; Cancer (age of onset: 60) in her sister; Cancer (age of onset: 65) in her brother; Diabetes in her mother; No Known Problems in her brother and daughter; Stroke in her father.    Allergies: No known allergies     Meds:   Current Outpatient Medications on File Prior to Visit   Medication Sig Dispense Refill    amantadine  mg Tab Take 1 tablet in morning. Okay to re-dose 6 hours later if needed for tremor. 180 tablet 0    CALCIUM CARBONATE/VITAMIN D3 (CALCIUM 500 + D, D3, ORAL) Take by mouth.      carbidopa-levodopa  mg (SINEMET)  mg per tablet TAKE 1/2-1 TABLET BY MOUTH THREE TIMES A DAY WITH A MEAL AS DIRECTED 90 tablet 2    ibuprofen (ADVIL,MOTRIN) 200 MG tablet Take 200 mg by mouth as needed for Pain (as needed for pain).      rasagiline (AZILECT) 1 mg Tab Take 1 tablet (1 mg total) by mouth once daily. 90 tablet 0     No current facility-administered medications on file prior to visit.       Objective:     Physical Exam:    Vitals:    10/24/23 1417   BP: 131/73   BP Location: Left arm   Patient Position: Sitting   BP Method: Medium (Automatic)   Pulse: 84   Weight: 49 kg (108 lb 0.4 oz)   Height: 5' 1.5" (1.562 m)     Body mass index is 20.08 kg/m².    Constitutional  Well-developed, well-nourished, appears stated age   Cardiovascular  no LE edema bilaterally     ..III.  MOTOR EXAMINATION - last dose amantadine 90 min ago       Speech  1 - Slight loss of expression, dictation, and/or volumn.   Facial Expression  2 - Slight but definitely abnormal diminution of facial expression.  "   Tremor at Rest:      Face, lips, chin 0 - Absent.    Hands:      right 0 - Absent.    left 1 - Slight and infrequently present.    Feet:      right 0 - Absent.    left 0 - Absent.    Action or Postural Tremor of Hands      right 0 - Absent.    left 1 - Slight; present with action.   Rigidity      Neck 2 - Mild or moderate.   Upper Extremity: Right 1 - Slight or detectable only when activated by mirror or other movements.   Upper Extremity: Left 2 - Mild or moderate.   Lower Extremity: Right 0 - Absent.   Lower Extremity: Left 2 - Mild or moderate.   Finger Taps      right 1 - Mild slowing and/or reduction in amplitude.   left 2 - Moderately impaired. Definite and early fatiguing. May have occasional arrests in movement.   Hand Movements      right 0 - Normal.   left 2 - Moderately impaired. Definite and early fatiguing. May have occasional arrests in movement.   Rapid Alternating Movements of Hands      right 1 - Mild slowing and/or reduction in amplitude.   left 2 - Moderately impaired. Definite and early fatiguing. May have occasional arrests in movement.   Leg Agility      right 0 - Normal.   left 1 - Mild slowing and/or reduction in amplitude.   Arising from Chair  0 - Normal.   Posture  1 - Not quite erect, slightly stooped posture; could be normal for older person.   Gait  1 - Walks slowly, may shuffle with short steps, but no festination (hastening steps) or propulsion.   Postural Stability (Response to sudden, strong posterior displacement produced by pull on shoulders while patient erect with eyes open and feet slightly apart. Patient is prepared, and can have had some practice runs.)  deferred   Body Bradykinesia and Hypokinesia (Combining slowness, hesitancy, decreased armswing, small amplitude, and poverty of movement in general)  1 - Minimal slowness, giving movement a deliberate character; could be normal for some persons. Possibly reduced amplitude.         Laboratory Results:  No visits with  results within 3 Month(s) from this visit.   Latest known visit with results is:   No results found for any previous visit.           Imaging:   No results found for this or any previous visit.        Assessment and Plan     Parkinson's disease without dyskinesia or fluctuating manifestations    Meningioma  Comments:  followed by internal medicine        Medical Decision Making:  Restart carbidopa/levodopa 25/100.   Try taking half tablet 30 minutes to one hour after dinner. Try this for 2-3 weeks.   If she can tolerate this, slowly add another half tablet carbidopa/levodopa.   Discussed carbidopa and levodopa.   She will check with her pharmacy about pricing for additional carbidopa 25 mg. Anticipate she would need one tablet three times daily.   Look at the Good [facility]ena.     If unable to afford, we could look at adding Zofran to help nausea if needed.  Sometimes carbidopa/levodopa continued release can be better tolerated.     Message for questions or issues.       Follow up in 6 mos.     ..Total time: 43 minutes spent on the encounter, which includes face to face time and non-face to face time preparing to see the patient (eg, review of tests), Obtaining and/or reviewing separately obtained history, Documenting clinical information in the electronic or other health record, Independently interpreting results (not separately reported) and communicating results to the patient/family/caregiver, or Care coordination (not separately reported).     Debbie Tran, DELIO, NP-C  Division of Movement and Memory Disorders  [facility]  [phone]

## 2023-10-24 ENCOUNTER — OFFICE VISIT (OUTPATIENT)
Dept: NEUROLOGY | Facility: CLINIC | Age: 73
End: 2023-10-24
Payer: COMMERCIAL

## 2023-10-24 VITALS
WEIGHT: 108 LBS | HEART RATE: 84 BPM | BODY MASS INDEX: 19.88 KG/M2 | SYSTOLIC BLOOD PRESSURE: 131 MMHG | DIASTOLIC BLOOD PRESSURE: 73 MMHG | HEIGHT: 62 IN

## 2023-10-24 DIAGNOSIS — G20.A1 PARKINSON'S DISEASE WITHOUT DYSKINESIA OR FLUCTUATING MANIFESTATIONS: ICD-10-CM

## 2023-10-24 DIAGNOSIS — D32.9 MENINGIOMA: ICD-10-CM

## 2023-10-24 PROCEDURE — 1126F AMNT PAIN NOTED NONE PRSNT: CPT | Mod: CPTII,S$GLB,, | Performed by: NURSE PRACTITIONER

## 2023-10-24 PROCEDURE — 3075F PR MOST RECENT SYSTOLIC BLOOD PRESS GE 130-139MM HG: ICD-10-PCS | Mod: CPTII,S$GLB,, | Performed by: NURSE PRACTITIONER

## 2023-10-24 PROCEDURE — 1126F PR PAIN SEVERITY QUANTIFIED, NO PAIN PRESENT: ICD-10-PCS | Mod: CPTII,S$GLB,, | Performed by: NURSE PRACTITIONER

## 2023-10-24 PROCEDURE — 3288F PR FALLS RISK ASSESSMENT DOCUMENTED: ICD-10-PCS | Mod: CPTII,S$GLB,, | Performed by: NURSE PRACTITIONER

## 2023-10-24 PROCEDURE — 99999 PR PBB SHADOW E&M-EST. PATIENT-LVL IV: CPT | Mod: PBBFAC,,, | Performed by: NURSE PRACTITIONER

## 2023-10-24 PROCEDURE — 3008F PR BODY MASS INDEX (BMI) DOCUMENTED: ICD-10-PCS | Mod: CPTII,S$GLB,, | Performed by: NURSE PRACTITIONER

## 2023-10-24 PROCEDURE — 3288F FALL RISK ASSESSMENT DOCD: CPT | Mod: CPTII,S$GLB,, | Performed by: NURSE PRACTITIONER

## 2023-10-24 PROCEDURE — 3078F DIAST BP <80 MM HG: CPT | Mod: CPTII,S$GLB,, | Performed by: NURSE PRACTITIONER

## 2023-10-24 PROCEDURE — 3078F PR MOST RECENT DIASTOLIC BLOOD PRESSURE < 80 MM HG: ICD-10-PCS | Mod: CPTII,S$GLB,, | Performed by: NURSE PRACTITIONER

## 2023-10-24 PROCEDURE — 1101F PR PT FALLS ASSESS DOC 0-1 FALLS W/OUT INJ PAST YR: ICD-10-PCS | Mod: CPTII,S$GLB,, | Performed by: NURSE PRACTITIONER

## 2023-10-24 PROCEDURE — 3008F BODY MASS INDEX DOCD: CPT | Mod: CPTII,S$GLB,, | Performed by: NURSE PRACTITIONER

## 2023-10-24 PROCEDURE — 99215 OFFICE O/P EST HI 40 MIN: CPT | Mod: S$GLB,,, | Performed by: NURSE PRACTITIONER

## 2023-10-24 PROCEDURE — 1159F PR MEDICATION LIST DOCUMENTED IN MEDICAL RECORD: ICD-10-PCS | Mod: CPTII,S$GLB,, | Performed by: NURSE PRACTITIONER

## 2023-10-24 PROCEDURE — 3075F SYST BP GE 130 - 139MM HG: CPT | Mod: CPTII,S$GLB,, | Performed by: NURSE PRACTITIONER

## 2023-10-24 PROCEDURE — 99215 PR OFFICE/OUTPT VISIT, EST, LEVL V, 40-54 MIN: ICD-10-PCS | Mod: S$GLB,,, | Performed by: NURSE PRACTITIONER

## 2023-10-24 PROCEDURE — 1160F RVW MEDS BY RX/DR IN RCRD: CPT | Mod: CPTII,S$GLB,, | Performed by: NURSE PRACTITIONER

## 2023-10-24 PROCEDURE — 99999 PR PBB SHADOW E&M-EST. PATIENT-LVL IV: ICD-10-PCS | Mod: PBBFAC,,, | Performed by: NURSE PRACTITIONER

## 2023-10-24 PROCEDURE — 1101F PT FALLS ASSESS-DOCD LE1/YR: CPT | Mod: CPTII,S$GLB,, | Performed by: NURSE PRACTITIONER

## 2023-10-24 PROCEDURE — 1160F PR REVIEW ALL MEDS BY PRESCRIBER/CLIN PHARMACIST DOCUMENTED: ICD-10-PCS | Mod: CPTII,S$GLB,, | Performed by: NURSE PRACTITIONER

## 2023-10-24 PROCEDURE — 1159F MED LIST DOCD IN RCRD: CPT | Mod: CPTII,S$GLB,, | Performed by: NURSE PRACTITIONER

## 2023-10-24 NOTE — PATIENT INSTRUCTIONS
Restart carbidopa/levodopa 25/100.   Try taking half tablet 30 minutes to one hour after dinner. Try this for 2-3 weeks.   If you can tolerate this, you can slowly add another half tablet carbidopa/levodopa.     See if your regular pharmacy can give you a price on carbidopa 25 mg. I anticipate that you would need one tablet three times daily.     If nausea persists, we can add additional carbidopa 25 mg to see if we can reduce nausea. It can be costly.   Look at the Good Rx [facility].     This may help make carbidopa more affordable.     We can also look at adding Zofran to help nausea if needed.    Sometimes carbidopa/levodopa continued release can be better tolerated.     Message me if you have questions or issues.     Keep me posted on portal how things are going.    Follow up in 6 mos.

## 2023-11-06 PROBLEM — G20.A1 PARKINSON'S DISEASE WITHOUT DYSKINESIA OR FLUCTUATING MANIFESTATIONS: Status: ACTIVE | Noted: 2023-11-06

## 2023-11-28 DIAGNOSIS — G20.A1 PARKINSON DISEASE: ICD-10-CM

## 2023-11-29 RX ORDER — AMANTADINE HYDROCHLORIDE 100 MG/1
TABLET ORAL
Qty: 180 TABLET | Refills: 3 | Status: SHIPPED | OUTPATIENT
Start: 2023-11-29

## 2023-11-29 RX ORDER — RASAGILINE 1 MG/1
1 TABLET ORAL DAILY
Qty: 90 TABLET | Refills: 3 | Status: SHIPPED | OUTPATIENT
Start: 2023-11-29

## 2024-02-07 ENCOUNTER — PATIENT MESSAGE (OUTPATIENT)
Dept: RESEARCH | Facility: HOSPITAL | Age: 74
End: 2024-02-07
Payer: COMMERCIAL

## 2024-05-22 ENCOUNTER — PATIENT MESSAGE (OUTPATIENT)
Dept: NEUROLOGY | Facility: CLINIC | Age: 74
End: 2024-05-22
Payer: COMMERCIAL

## 2024-05-31 NOTE — TELEPHONE ENCOUNTER
Airway    Performed by: Antonissen, Jeffery P, CRNA  Authorized by: Hector Mcgee MD    Final Airway Type:  Supraglottic airway  Final Supraglottic Airway:  Unique  SGA Size*:  4  Attempts*:  1   Patient Identified, Procedure confirmed, Emergency equipment available and Safety protocols followed  Location:  OR  Urgency:  Elective  Difficult Airway: No    Indications for Airway Management:  Anesthesia  Sedation Level:  Anesthetized  Mask Difficulty Assessment:  1 - vent by mask  Start Time: 5/31/2024 3:03 PM       Lm for pt to contact clinic to discuss it has been over a year, will need an appt to get refill. pt advised iin message there are no in person visits at this time, will need a video visit.

## 2024-10-01 DIAGNOSIS — G20.A1 PARKINSON DISEASE: ICD-10-CM

## 2024-10-01 DIAGNOSIS — G20.A1 PARKINSON'S DISEASE: ICD-10-CM

## 2024-10-03 RX ORDER — RASAGILINE 1 MG/1
1 TABLET ORAL DAILY
Qty: 90 TABLET | Refills: 0 | Status: SHIPPED | OUTPATIENT
Start: 2024-10-03

## 2024-10-03 RX ORDER — CARBIDOPA AND LEVODOPA 25; 100 MG/1; MG/1
TABLET ORAL
Qty: 90 TABLET | Refills: 0 | Status: SHIPPED | OUTPATIENT
Start: 2024-10-03

## 2024-10-03 RX ORDER — AMANTADINE HYDROCHLORIDE 100 MG/1
TABLET ORAL
Qty: 180 TABLET | Refills: 0 | Status: SHIPPED | OUTPATIENT
Start: 2024-10-03

## 2024-11-20 DIAGNOSIS — G20.A1 PARKINSON'S DISEASE: ICD-10-CM

## 2024-11-21 RX ORDER — CARBIDOPA AND LEVODOPA 25; 100 MG/1; MG/1
TABLET ORAL
Qty: 90 TABLET | Refills: 0 | Status: SHIPPED | OUTPATIENT
Start: 2024-11-21

## 2024-12-16 DIAGNOSIS — G20.A1 PARKINSON DISEASE: ICD-10-CM

## 2024-12-19 ENCOUNTER — TELEPHONE (OUTPATIENT)
Dept: NEUROLOGY | Facility: CLINIC | Age: 74
End: 2024-12-19
Payer: COMMERCIAL

## 2024-12-20 ENCOUNTER — TELEPHONE (OUTPATIENT)
Dept: NEUROLOGY | Facility: CLINIC | Age: 74
End: 2024-12-20
Payer: COMMERCIAL

## 2024-12-20 RX ORDER — RASAGILINE 1 MG/1
1 TABLET ORAL DAILY
Qty: 30 TABLET | Refills: 0 | Status: SHIPPED | OUTPATIENT
Start: 2024-12-20

## 2024-12-20 NOTE — TELEPHONE ENCOUNTER
----- Message from Chica sent at 12/19/2024 11:38 AM CST -----  Regarding: Returning Call  Contact: [phone]  Type:  Patient Returning Call    Who Called: Marie Blake     Who Left Message for Patient: Zak     Does the patient know what this is regarding?: Scheduling Follow up appt     Would the patient rather a call back or a response via [facility]? Call    Best Call Back Number: [phone]

## 2024-12-20 NOTE — TELEPHONE ENCOUNTER
----- Message from Robin sent at 12/20/2024  1:44 PM CST -----  Regarding: Missed call  Contact: [phone]  Caller is returning a missed called from Zak Dukes Jr., MA in the office. Please call back as soon as possible.

## 2025-02-17 ENCOUNTER — TELEPHONE (OUTPATIENT)
Facility: CLINIC | Age: 75
End: 2025-02-17
Payer: COMMERCIAL

## 2025-02-17 ENCOUNTER — PATIENT MESSAGE (OUTPATIENT)
Facility: CLINIC | Age: 75
End: 2025-02-17
Payer: MEDICARE

## 2025-02-17 NOTE — TELEPHONE ENCOUNTER
Called Both PT and spouse about making a virtual appt to see the provider to get a refill on medications. Both did not  left a message.

## 2025-02-17 NOTE — TELEPHONE ENCOUNTER
Called PT again about setting up a virtual visit to see the provider to get a refill on her RX. PT did not  left a message.

## 2025-02-17 NOTE — TELEPHONE ENCOUNTER
----- Message from Lulu sent at 2/17/2025 12:06 PM CST -----  Regarding: Pt returned call for Zak  Contact: [phone]  Type:  Patient Returning Call  Who Called:ADRIAN HARDEN [[id number]]  Who Left Message for Patient:Zak  Does the patient know what this is regarding?Pt returned call for Zak. Please advise   Best Call Back Number:Telephone Information:Mobile          [phone]  Additional Information:

## 2025-02-17 NOTE — TELEPHONE ENCOUNTER
----- Message from Opal sent at 2/14/2025  4:36 PM CST -----  Regarding: appt request  Name of Who is Calling: Marie           What is the request in detail: Patient is requesting a call back to schedule an appointment 2/17-3/7 for medication refill..           Can the clinic reply by [facility]: No           What Number to Call Back if not in [facility]: [phone]

## 2025-02-17 NOTE — TELEPHONE ENCOUNTER
Called PT today about offering a virtual visit with the provider. PT tells me she lives in [place]. I inform her that she is not licensed in [place] and she need to come to [facility] to do a virtual visit. Pt under stands and will be going to [place] in march. PT has been schedule on 3/17 at 2:15pm.

## 2025-02-25 ENCOUNTER — OFFICE VISIT (OUTPATIENT)
Facility: CLINIC | Age: 75
End: 2025-02-25
Payer: MEDICARE

## 2025-02-25 VITALS
SYSTOLIC BLOOD PRESSURE: 129 MMHG | WEIGHT: 105.38 LBS | HEART RATE: 89 BPM | BODY MASS INDEX: 19.39 KG/M2 | HEIGHT: 62 IN | DIASTOLIC BLOOD PRESSURE: 69 MMHG

## 2025-02-25 DIAGNOSIS — G20.A1 PARKINSON'S DISEASE WITHOUT DYSKINESIA OR FLUCTUATING MANIFESTATIONS: Primary | ICD-10-CM

## 2025-02-25 DIAGNOSIS — R13.10 DYSPHAGIA, UNSPECIFIED TYPE: ICD-10-CM

## 2025-02-25 DIAGNOSIS — G47.52 DREAM ENACTMENT BEHAVIOR: ICD-10-CM

## 2025-02-25 PROCEDURE — 3288F FALL RISK ASSESSMENT DOCD: CPT | Mod: CPTII,S$GLB,, | Performed by: NURSE PRACTITIONER

## 2025-02-25 PROCEDURE — 3078F DIAST BP <80 MM HG: CPT | Mod: CPTII,S$GLB,, | Performed by: NURSE PRACTITIONER

## 2025-02-25 PROCEDURE — 1126F AMNT PAIN NOTED NONE PRSNT: CPT | Mod: CPTII,S$GLB,, | Performed by: NURSE PRACTITIONER

## 2025-02-25 PROCEDURE — G2211 COMPLEX E/M VISIT ADD ON: HCPCS | Mod: S$GLB,,, | Performed by: NURSE PRACTITIONER

## 2025-02-25 PROCEDURE — 3074F SYST BP LT 130 MM HG: CPT | Mod: CPTII,S$GLB,, | Performed by: NURSE PRACTITIONER

## 2025-02-25 PROCEDURE — 1160F RVW MEDS BY RX/DR IN RCRD: CPT | Mod: CPTII,S$GLB,, | Performed by: NURSE PRACTITIONER

## 2025-02-25 PROCEDURE — 99999 PR PBB SHADOW E&M-EST. PATIENT-LVL IV: CPT | Mod: PBBFAC,,, | Performed by: NURSE PRACTITIONER

## 2025-02-25 PROCEDURE — 99215 OFFICE O/P EST HI 40 MIN: CPT | Mod: S$GLB,,, | Performed by: NURSE PRACTITIONER

## 2025-02-25 PROCEDURE — 1159F MED LIST DOCD IN RCRD: CPT | Mod: CPTII,S$GLB,, | Performed by: NURSE PRACTITIONER

## 2025-02-25 PROCEDURE — 3008F BODY MASS INDEX DOCD: CPT | Mod: CPTII,S$GLB,, | Performed by: NURSE PRACTITIONER

## 2025-02-25 PROCEDURE — 1101F PT FALLS ASSESS-DOCD LE1/YR: CPT | Mod: CPTII,S$GLB,, | Performed by: NURSE PRACTITIONER

## 2025-02-25 RX ORDER — CARBIDOPA 25 MG/1
25 TABLET ORAL 3 TIMES DAILY
Qty: 90 TABLET | Refills: 11 | Status: SHIPPED | OUTPATIENT
Start: 2025-02-25 | End: 2026-02-25

## 2025-02-25 RX ORDER — CARBIDOPA AND LEVODOPA 25; 100 MG/1; MG/1
TABLET ORAL
Qty: 90 TABLET | Refills: 11 | Status: SHIPPED | OUTPATIENT
Start: 2025-02-25

## 2025-02-25 NOTE — PROGRESS NOTES
Name: Marie Blake  MRN: [id number]   CSN: [id number]      Date: 2-25-25      Referring physician:  No referring provider defined for this encounter.    Subjective:      Chief Complaint: Parkinson's disease     History of Present Illness (HPI):    Marie Blake is a 74 y.o. RH female with menigoma (discovered on brain scan 2016 after a fall, scanned every 1-2 years by PCP) who presents today for a follow-up evaluation of Parkinson's Disease (tremor onset 2015) and is alone. Last seen in office 10-24-23. At that time, re-started carbidopa/levodopa slowly having her take 30-60 min after meals to see if his lessened nausea vs adding lodosyn.       How many falls have you had in the last 90 days? 0  Of those falls, how many have been in the past 30 days? 0  How many hospitalizations have you had in the last 90 days? 0  Of those hospitalizations, how many have been in the last 30 days? 0        Tried to take carbidopa/levodopa at night before bed and did not bother me with nausea.   She has not been taking it.     She knows symptoms are getting worse.       Occ notes urine urgency. Other times she does not. This has been going on about a year.   If not sleeping well, may have to urinate every  3 hours. If sleepign well, it is not an issue.   She does feel like she is empyting baldder.  No UTIs.     Tremor gettign worse. Note RH and R arm.   Some in legs.     More effort to stay balanced.     She does feel slow.     She is aware of stiffness. Takes longer to loosen.     She walks 10K steps and rides bike once in while.  Hikes sometimes in park. Not as comfortable with its now.     She does feel like rasagiline and amantadine are very helpful.     Going to try live in [facility] for prat of the year. rasagiline    9-1-6    Review of Systems   HENT:  Negative for trouble swallowing.    Respiratory:  Positive for choking (on water sometimes or if drink too fast).    Gastrointestinal:  Negative for  "constipation.   Neurological:  Positive for tremors.   Psychiatric/Behavioral:  Positive for sleep disturbance (varies-  has told me i sometimes talk in sleep). Negative for dysphoric mood and hallucinations. The patient is not nervous/anxious.        Past Medical History: The patient  has a past medical history of Meningioma and Movement disorder.    Social History: The patient  reports that she has never smoked. She does not have any smokeless tobacco history on file. She reports that she does not drink alcohol and does not use drugs.    Family History: Their family history includes Cancer (age of onset: 50) in her maternal uncle; Cancer (age of onset: 58) in her sister; Cancer (age of onset: 60) in her sister; Cancer (age of onset: 65) in her brother; Diabetes in her mother; No Known Problems in her brother and daughter; Stroke in her father.    Allergies: No known allergies     Meds: Medications Ordered Prior to Encounter[1]    Objective:     Physical Exam:    Vitals:    02/25/25 1306   BP: 129/69   BP Location: Right arm   Patient Position: Sitting   Pulse: 89   Weight: 47.8 kg (105 lb 6.1 oz)   Height: 5' 1.5" (1.562 m)     Body mass index is 19.59 kg/m².    Constitutional  Well-developed, well-nourished, appears stated age   Cardiovascular  no LE edema bilaterally     ..III.  MOTOR EXAMINATION exam time 1355       Speech  2 - Monotone, slurred but understandable; moderately impaired.   Facial Expression  2 - Slight but definitely abnormal diminution of facial expression.    Tremor at Rest:      Face, lips, chin 0 - Absent.    Hands:      right 0 - Absent.    left 1 - Slight and infrequently present.    Feet:      right 0 - Absent.    left 0 - Absent.    Action or Postural Tremor of Hands      right 0 - Absent.    left 1 - Slight; present with action.   Rigidity      Neck 2 - Mild or moderate.   Upper Extremity: Right 2 - Mild or moderate.   Upper Extremity: Left 2 - Mild or moderate.   Lower Extremity: " Right 2 - Mild or moderate.   Lower Extremity: Left 2 - Mild or moderate.   Finger Taps      right 1 - Mild slowing and/or reduction in amplitude.   left 2 - Moderately impaired. Definite and early fatiguing. May have occasional arrests in movement.   Hand Movements      right 1 - Mild slowing and/or reduction in amplitude.   left 2 - Moderately impaired. Definite and early fatiguing. May have occasional arrests in movement.   Rapid Alternating Movements of Hands      right 1 - Mild slowing and/or reduction in amplitude.   left 2 - Moderately impaired. Definite and early fatiguing. May have occasional arrests in movement.   Leg Agility      right 1 - Mild slowing and/or reduction in amplitude.   left 2 - Moderately impaired. Definite and early fatiguing. May have occasional arrests in movement.   Arising from Chair  1 - Slow; or may need more than one attempt.   Posture  1 - Not quite erect, slightly stooped posture; could be normal for older person.   Gait  2 - Walks with difficulty, but requires little or no assistance; may have some festination, short steps, or propulsion.   Postural Stability (Response to sudden, strong posterior displacement produced by pull on shoulders while patient erect with eyes open and feet slightly apart. Patient is prepared, and can have had some practice runs.)  deferred   Body Bradykinesia and Hypokinesia (Combining slowness, hesitancy, decreased armswing, small amplitude, and poverty of movement in general)  2 - Mild degree of slowness and poverty of movement which is definitely abnormal.                   Imaging:   No results found for this or any previous visit.        Assessment and Plan     Parkinson's disease without dyskinesia or fluctuating manifestations  -     carbidopa-levodopa  mg (SINEMET)  mg per tablet; Take half to whole tablet three times daily as directed. Follow instructions on portal. Take carbidopa 25 mg with this medication.  Dispense: 90 tablet;  Refill: 11  -     carbidopa (LODOSYN) 25 mg tablet; Take 1 tablet (25 mg total) by mouth 3 (three) times daily.  Dispense: 90 tablet; Refill: 11    Dream enactment behavior  Comments:  sometimes talks in sleep    Dysphagia, unspecified type        Medical Decision Making:      Continue amantadine and rasagiline.    I would really like for you to retry carbidopa/levodopa. Since it has caused nausea, I would like to add carbidopa to the regimen to see if this helps reduce or stop nausea. Carbidopa can be costly for some people. Check with your pharmacy. If carbidopa 25 mg is too costly, I can send this to [facility]'s pharmacy ([facility]). If I am not mistaken, I believe at his pharmacy, this medication is less than $40 per month.     Retry carbidopa/levodopa with carbidopa as follows:    Week 1   9AM  Take half tablet carbidopa/levodopa   Take one whole tablet carbidopa  1PM  Take half tablet carbidopa/levodopa   Take one whole tablet carbidopa  6PM  Take half tablet carbidopa/levodopa   Take one whole tablet carbidopa     Week 2  9AM  Take whole tablet carbidopa/levodopa   Take one whole tablet carbidopa  1PM  Take half tablet carbidopa/levodopa   Take one whole tablet carbidopa  6PM  Take half tablet carbidopa/levodopa   Take one whole tablet carbidopa    Week 3  9AM  Take whole tablet carbidopa/levodopa   Take one whole tablet carbidopa  1PM  Take whole tablet carbidopa/levodopa   Take one whole tablet carbidopa  6PM  Take half  tablet carbidopa/levodopa   Take one whole tablet carbidopa    Week 4  9AM  Take whole tablet carbidopa/levodopa   Take one whole tablet carbidopa  1PM  Take whole tablet carbidopa/levodopa   Take one whole tablet carbidopa  6PM  Take whole tablet carbidopa/levodopa   Take one whole tablet carbidopa      Watch swallowing. If issues increase, let me know.    Needs referral for [facility] as will be splitting her time between [facility] and [place]. Referral faxed.       Follow up 12 mos in  person (as she will be in [place] for at least the next 6 mos).       ..Total time: 46 minutes spent on the encounter, which includes face to face time and non-face to face time preparing to see the patient (eg, review of tests), Obtaining and/or reviewing separately obtained history, Documenting clinical information in the electronic or other health record, Independently interpreting results (not separately reported) and communicating results to the patient/family/caregiver, or Care coordination (not separately reported).       ..      Debbie Tran, DELIO, NP-C  Division of Movement and Memory Disorders  [facility]  [phone]             [1]   Current Outpatient Medications on File Prior to Visit   Medication Sig Dispense Refill    CALCIUM CARBONATE/VITAMIN D3 (CALCIUM 500 + D, D3, ORAL) Take by mouth.      carbidopa-levodopa  mg (SINEMET)  mg per tablet TAKE 1/2-1 TABLET BY MOUTH THREE TIMES A DAY WITH A MEAL AS DIRECTED 90 tablet 0    ibuprofen (ADVIL,MOTRIN) 200 MG tablet Take 200 mg by mouth as needed for Pain (as needed for pain).       No current facility-administered medications on file prior to visit.

## 2025-02-25 NOTE — PATIENT INSTRUCTIONS
Continue amantadine and rasagiline.    I would really like for you to retry carbidopa/levodopa. Since it has caused nausea, I would like to add carbidopa to the regimen to see if this helps reduce or stop nausea. Carbidopa can be costly for some people. Check with your pharmacy. If carbidopa 25 mg is too costly, I can send this to [facility]'s pharmacy ([facility]). If I am not mistaken, I believe at his pharmacy, this medication is less than $40 per month.     Retry carbidopa/levodopa with carbidopa as follows:    Week 1   9AM  Take half tablet carbidopa/levodopa   Take one whole tablet carbidopa    1PM  Take half tablet carbidopa/levodopa   Take one whole tablet carbidopa    6PM  Take half tablet carbidopa/levodopa   Take one whole tablet carbidopa     Week 2  9AM  Take whole tablet carbidopa/levodopa   Take one whole tablet carbidopa    1PM  Take half tablet carbidopa/levodopa   Take one whole tablet carbidopa    6PM  Take half tablet carbidopa/levodopa   Take one whole tablet carbidopa    Week 3  9AM  Take whole tablet carbidopa/levodopa   Take one whole tablet carbidopa    1PM  Take whole tablet carbidopa/levodopa   Take one whole tablet carbidopa    6PM  Take half  tablet carbidopa/levodopa   Take one whole tablet carbidopa    Week 4  9AM  Take whole tablet carbidopa/levodopa   Take one whole tablet carbidopa    1PM  Take whole tablet carbidopa/levodopa   Take one whole tablet carbidopa    6PM  Take whole tablet carbidopa/levodopa   Take one whole tablet carbidopa

## 2025-03-05 DIAGNOSIS — G20.A1 PARKINSON DISEASE: ICD-10-CM

## 2025-03-06 RX ORDER — AMANTADINE HYDROCHLORIDE 100 MG/1
TABLET ORAL
Qty: 180 TABLET | Refills: 3 | Status: SHIPPED | OUTPATIENT
Start: 2025-03-06

## 2025-03-06 RX ORDER — RASAGILINE 1 MG/1
1 TABLET ORAL DAILY
Qty: 90 TABLET | Refills: 3 | Status: SHIPPED | OUTPATIENT
Start: 2025-03-06

## 2025-03-06 NOTE — TELEPHONE ENCOUNTER
REQUESTS REFERRAL TO BE FAXED TO [facility]  at [phone]

## 2025-05-16 ENCOUNTER — PATIENT MESSAGE (OUTPATIENT)
Facility: CLINIC | Age: 75
End: 2025-05-16
Payer: MEDICARE